# Patient Record
Sex: MALE | Race: WHITE | Employment: FULL TIME
[De-identification: names, ages, dates, MRNs, and addresses within clinical notes are randomized per-mention and may not be internally consistent; named-entity substitution may affect disease eponyms.]

---
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
## 2025-03-23 SDOH — HEALTH STABILITY: PHYSICAL HEALTH: ON AVERAGE, HOW MANY MINUTES DO YOU ENGAGE IN EXERCISE AT THIS LEVEL?: 60 MIN

## 2025-03-23 SDOH — HEALTH STABILITY: PHYSICAL HEALTH: ON AVERAGE, HOW MANY DAYS PER WEEK DO YOU ENGAGE IN MODERATE TO STRENUOUS EXERCISE (LIKE A BRISK WALK)?: 3 DAYS

## 2025-03-26 ENCOUNTER — OFFICE VISIT (OUTPATIENT)
Dept: PRIMARY CARE CLINIC | Facility: CLINIC | Age: 20
End: 2025-03-26
Payer: COMMERCIAL

## 2025-03-26 VITALS
DIASTOLIC BLOOD PRESSURE: 60 MMHG | SYSTOLIC BLOOD PRESSURE: 118 MMHG | HEIGHT: 72 IN | WEIGHT: 144.4 LBS | BODY MASS INDEX: 19.56 KG/M2

## 2025-03-26 DIAGNOSIS — B00.9 HSV INFECTION: ICD-10-CM

## 2025-03-26 DIAGNOSIS — J30.9 ALLERGIC RHINITIS, UNSPECIFIED SEASONALITY, UNSPECIFIED TRIGGER: ICD-10-CM

## 2025-03-26 DIAGNOSIS — Z00.00 ENCOUNTER FOR MEDICAL EXAMINATION TO ESTABLISH CARE: Primary | ICD-10-CM

## 2025-03-26 PROCEDURE — 99214 OFFICE O/P EST MOD 30 MIN: CPT | Performed by: INTERNAL MEDICINE

## 2025-03-26 RX ORDER — MULTIVITAMIN WITH IRON
1 TABLET ORAL DAILY
COMMUNITY

## 2025-03-26 SDOH — ECONOMIC STABILITY: FOOD INSECURITY: WITHIN THE PAST 12 MONTHS, YOU WORRIED THAT YOUR FOOD WOULD RUN OUT BEFORE YOU GOT MONEY TO BUY MORE.: NEVER TRUE

## 2025-03-26 SDOH — ECONOMIC STABILITY: FOOD INSECURITY: WITHIN THE PAST 12 MONTHS, THE FOOD YOU BOUGHT JUST DIDN'T LAST AND YOU DIDN'T HAVE MONEY TO GET MORE.: NEVER TRUE

## 2025-03-26 ASSESSMENT — PATIENT HEALTH QUESTIONNAIRE - PHQ9
SUM OF ALL RESPONSES TO PHQ QUESTIONS 1-9: 0
1. LITTLE INTEREST OR PLEASURE IN DOING THINGS: NOT AT ALL
2. FEELING DOWN, DEPRESSED OR HOPELESS: NOT AT ALL
SUM OF ALL RESPONSES TO PHQ QUESTIONS 1-9: 0

## 2025-03-26 ASSESSMENT — ENCOUNTER SYMPTOMS
GASTROINTESTINAL NEGATIVE: 1
RESPIRATORY NEGATIVE: 1

## 2025-03-26 NOTE — PROGRESS NOTES
FOLLOW UP VISIT    Subjective:    Bruno Solomon (: 2005) is a 20 y.o., male,   Chief Complaint   Patient presents with    Follow-up     Not fasting       HPI:  HPI  History of Present Illness  The patient presents to reestablish care.    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 No current issues or concerns are reported. A refill of Valtrex is not required at this time, as it is typically used only once annually.    A history of seasonal allergies is documented, although no symptoms are currently experienced.       The following portions of the patient's history were reviewed and updated as appropriate:      No past medical history on file.    No past surgical history on file.    Family History   Problem Relation Age of Onset    Congenital Hearing Loss Father     Bone Cancer Paternal Grandfather        Social History     Socioeconomic History    Marital status: Single     Spouse name: Not on file    Number of children: Not on file    Years of education: Not on file    Highest education level: Not on file   Occupational History    Not on file   Tobacco Use    Smoking status: Never    Smokeless tobacco: Never   Vaping Use    Vaping status: Never Used   Substance and Sexual Activity    Alcohol use: Yes     Comment: once a month    Drug use: Not Currently    Sexual activity: Not on file   Other Topics Concern    Not on file   Social History Narrative    Not on file     Social Drivers of Health     Financial Resource Strain: Low Risk  (2023)    Overall Financial Resource Strain (CARDIA)     Difficulty of Paying Living Expenses: Not very hard   Food Insecurity: No Food Insecurity (3/26/2025)    Hunger Vital Sign     Worried About Running Out of Food in the Last Year: Never true     Ran Out of Food in the Last Year: Never true   Transportation Needs: No Transportation Needs (3/26/2025)    PRAPARE - Transportation     Lack of Transportation (Medical): No     Lack of Transportation

## 2025-07-01 ENCOUNTER — OFFICE VISIT (OUTPATIENT)
Dept: PRIMARY CARE CLINIC | Facility: CLINIC | Age: 20
End: 2025-07-01
Payer: COMMERCIAL

## 2025-07-01 VITALS
HEIGHT: 72 IN | SYSTOLIC BLOOD PRESSURE: 114 MMHG | DIASTOLIC BLOOD PRESSURE: 78 MMHG | WEIGHT: 140 LBS | BODY MASS INDEX: 18.96 KG/M2 | OXYGEN SATURATION: 100 % | HEART RATE: 78 BPM

## 2025-07-01 DIAGNOSIS — B00.59 HSV (HERPES SIMPLEX VIRUS) INFECTION OF EYELID: ICD-10-CM

## 2025-07-01 DIAGNOSIS — Z00.00 ANNUAL PHYSICAL EXAM: ICD-10-CM

## 2025-07-01 DIAGNOSIS — Z00.00 ANNUAL PHYSICAL EXAM: Primary | ICD-10-CM

## 2025-07-01 LAB
ALBUMIN SERPL-MCNC: 4.6 G/DL (ref 3.5–5)
ALBUMIN/GLOB SERPL: 1.6 (ref 1–1.9)
ALP SERPL-CCNC: 82 U/L (ref 40–129)
ALT SERPL-CCNC: 15 U/L (ref 8–55)
ANION GAP SERPL CALC-SCNC: 11 MMOL/L (ref 7–16)
APPEARANCE UR: CLEAR
AST SERPL-CCNC: 18 U/L (ref 15–37)
BACTERIA URNS QL MICRO: NEGATIVE /HPF
BASOPHILS # BLD: 0.03 K/UL (ref 0–0.2)
BASOPHILS NFR BLD: 0.8 % (ref 0–2)
BILIRUB SERPL-MCNC: 2.4 MG/DL (ref 0–1.2)
BILIRUB UR QL: NEGATIVE
BUN SERPL-MCNC: 13 MG/DL (ref 6–23)
CALCIUM SERPL-MCNC: 9.8 MG/DL (ref 8.8–10.2)
CASTS URNS QL MICRO: 0 /LPF
CHLORIDE SERPL-SCNC: 104 MMOL/L (ref 98–107)
CHOLEST SERPL-MCNC: 148 MG/DL (ref 0–200)
CO2 SERPL-SCNC: 27 MMOL/L (ref 20–29)
COLOR UR: NORMAL
CREAT SERPL-MCNC: 0.9 MG/DL (ref 0.8–1.3)
CRYSTALS URNS QL MICRO: 0 /LPF
DIFFERENTIAL METHOD BLD: ABNORMAL
EOSINOPHIL # BLD: 0.04 K/UL (ref 0–0.8)
EOSINOPHIL NFR BLD: 1 % (ref 0.5–7.8)
EPI CELLS #/AREA URNS HPF: NORMAL /HPF (ref 0–5)
ERYTHROCYTE [DISTWIDTH] IN BLOOD BY AUTOMATED COUNT: 12.5 % (ref 11.9–14.6)
GLOBULIN SER CALC-MCNC: 3 G/DL (ref 2.3–3.5)
GLUCOSE SERPL-MCNC: 96 MG/DL (ref 70–99)
GLUCOSE UR STRIP.AUTO-MCNC: NEGATIVE MG/DL
HCT VFR BLD AUTO: 50.6 % (ref 41.1–50.3)
HDLC SERPL-MCNC: 61 MG/DL (ref 40–60)
HDLC SERPL: 2.4 (ref 0–5)
HGB BLD-MCNC: 16.7 G/DL (ref 13.6–17.2)
HGB UR QL STRIP: NEGATIVE
HYALINE CASTS URNS QL MICRO: NORMAL /LPF
IMM GRANULOCYTES # BLD AUTO: 0.01 K/UL (ref 0–0.5)
IMM GRANULOCYTES NFR BLD AUTO: 0.3 % (ref 0–5)
KETONES UR QL STRIP.AUTO: NEGATIVE MG/DL
LDLC SERPL CALC-MCNC: 70 MG/DL (ref 0–100)
LEUKOCYTE ESTERASE UR QL STRIP.AUTO: NEGATIVE
LYMPHOCYTES # BLD: 1.65 K/UL (ref 0.5–4.6)
LYMPHOCYTES NFR BLD: 42.3 % (ref 13–44)
MCH RBC QN AUTO: 30.6 PG (ref 26.1–32.9)
MCHC RBC AUTO-ENTMCNC: 33 G/DL (ref 31.4–35)
MCV RBC AUTO: 92.8 FL (ref 82–102)
MONOCYTES # BLD: 0.31 K/UL (ref 0.1–1.3)
MONOCYTES NFR BLD: 7.9 % (ref 4–12)
MUCOUS THREADS URNS QL MICRO: 0 /LPF
NEUTS SEG # BLD: 1.86 K/UL (ref 1.7–8.2)
NEUTS SEG NFR BLD: 47.7 % (ref 43–78)
NITRITE UR QL STRIP.AUTO: NEGATIVE
NRBC # BLD: 0 K/UL (ref 0–0.2)
PH UR STRIP: 7 (ref 5–9)
PLATELET # BLD AUTO: 164 K/UL (ref 150–450)
PMV BLD AUTO: 11 FL (ref 9.4–12.3)
POTASSIUM SERPL-SCNC: 4.2 MMOL/L (ref 3.5–5.1)
PROT SERPL-MCNC: 7.6 G/DL (ref 6.3–8.2)
PROT UR STRIP-MCNC: NEGATIVE MG/DL
RBC # BLD AUTO: 5.45 M/UL (ref 4.23–5.6)
RBC #/AREA URNS HPF: NORMAL /HPF (ref 0–5)
SODIUM SERPL-SCNC: 142 MMOL/L (ref 136–145)
SP GR UR REFRACTOMETRY: 1.02 (ref 1–1.02)
TRIGL SERPL-MCNC: 88 MG/DL (ref 0–150)
TSH, 3RD GENERATION: 1.39 UIU/ML (ref 0.27–4.2)
URINE CULTURE IF INDICATED: NORMAL
UROBILINOGEN UR QL STRIP.AUTO: 0.2 EU/DL (ref 0.2–1)
VLDLC SERPL CALC-MCNC: 18 MG/DL (ref 6–23)
WBC # BLD AUTO: 3.9 K/UL (ref 4.3–11.1)
WBC URNS QL MICRO: NORMAL /HPF (ref 0–4)

## 2025-07-01 PROCEDURE — 99395 PREV VISIT EST AGE 18-39: CPT | Performed by: INTERNAL MEDICINE

## 2025-07-01 RX ORDER — VALACYCLOVIR HYDROCHLORIDE 500 MG/1
500 TABLET, FILM COATED ORAL 2 TIMES DAILY
Qty: 60 TABLET | Refills: 1 | Status: SHIPPED | OUTPATIENT
Start: 2025-07-01

## 2025-07-01 ASSESSMENT — ENCOUNTER SYMPTOMS
ABDOMINAL PAIN: 0
COLOR CHANGE: 0
CHOKING: 0
EYE REDNESS: 0
ANAL BLEEDING: 0
CHEST TIGHTNESS: 0
BACK PAIN: 0
EYE ITCHING: 0
SINUS PRESSURE: 0
CONSTIPATION: 0
GASTROINTESTINAL NEGATIVE: 1
SHORTNESS OF BREATH: 0
ALLERGIC/IMMUNOLOGIC NEGATIVE: 1
EYE DISCHARGE: 0
NAUSEA: 0
WHEEZING: 0
RESPIRATORY NEGATIVE: 1
EYE PAIN: 0
RHINORRHEA: 0
COUGH: 0
DIARRHEA: 0
ABDOMINAL DISTENTION: 0
RECTAL PAIN: 0
BLOOD IN STOOL: 0
STRIDOR: 0
EYES NEGATIVE: 1

## 2025-07-01 NOTE — PROGRESS NOTES
FOLLOW UP VISIT    Subjective:    Bruno Solomon (: 2005) is a 20 y.o., male,   Chief Complaint   Patient presents with    Annual Exam       HPI:  HPI    20 year old in for cpe  The following portions of the patient's history were reviewed and updated as appropriate:      No past medical history on file.    No past surgical history on file.    Family History   Problem Relation Age of Onset    Congenital Hearing Loss Father     Bone Cancer Paternal Grandfather        Social History     Socioeconomic History    Marital status: Single     Spouse name: Not on file    Number of children: Not on file    Years of education: Not on file    Highest education level: Not on file   Occupational History    Not on file   Tobacco Use    Smoking status: Never    Smokeless tobacco: Never   Vaping Use    Vaping status: Never Used   Substance and Sexual Activity    Alcohol use: Yes     Comment: once a month    Drug use: Not Currently    Sexual activity: Not on file   Other Topics Concern    Not on file   Social History Narrative    Not on file     Social Drivers of Health     Financial Resource Strain: Low Risk  (2023)    Overall Financial Resource Strain (CARDIA)     Difficulty of Paying Living Expenses: Not very hard   Food Insecurity: No Food Insecurity (3/26/2025)    Hunger Vital Sign     Worried About Running Out of Food in the Last Year: Never true     Ran Out of Food in the Last Year: Never true   Transportation Needs: No Transportation Needs (3/26/2025)    PRAPARE - Transportation     Lack of Transportation (Medical): No     Lack of Transportation (Non-Medical): No   Physical Activity: Sufficiently Active (3/23/2025)    Exercise Vital Sign     Days of Exercise per Week: 3 days     Minutes of Exercise per Session: 60 min   Stress: Not on file   Social Connections: Unknown (3/19/2021)    Received from [facility]    Social Connections     Frequency of Communication with Friends and Family: Not asked     Frequency of

## 2025-07-02 ENCOUNTER — RESULTS FOLLOW-UP (OUTPATIENT)
Dept: PRIMARY CARE CLINIC | Facility: CLINIC | Age: 20
End: 2025-07-02

## 2025-07-07 ENCOUNTER — TELEMEDICINE (OUTPATIENT)
Dept: PRIMARY CARE CLINIC | Facility: CLINIC | Age: 20
End: 2025-07-07
Payer: COMMERCIAL

## 2025-07-07 DIAGNOSIS — Z67.91 UNSPECIFIED BLOOD TYPE, RH NEGATIVE: ICD-10-CM

## 2025-07-07 DIAGNOSIS — D72.819 LEUKOPENIA, UNSPECIFIED TYPE: Primary | ICD-10-CM

## 2025-07-07 DIAGNOSIS — R17 HIGH BILIRUBIN: ICD-10-CM

## 2025-07-07 PROCEDURE — 99214 OFFICE O/P EST MOD 30 MIN: CPT | Performed by: INTERNAL MEDICINE

## 2025-07-07 NOTE — PROGRESS NOTES
FOLLOW UP VISIT    Subjective:    Bruno Solomon (: 2005) is a 20 y.o., male,   No chief complaint on file.      HPI:  HPI    History of Present Illness  The patient is here for a follow-up visit.    High Bilirubin  - Their bilirubin is high.  - We will check a total direct and indirect bilirubin in 2 weeks, nonfasting.  - Recheck total and indirect bilirubin.    Low White Blood Cell Count  - Their white blood cell count is slightly low.  - We will recheck a white blood cell count in 2 weeks, nonfasting.  - Recheck white blood cell count.    Hematocrit  - Their hematocrit is up.    Cholesterol  - Cholesterol is good.  - HDL is high.    Supplemental information: They are on Valtrex as needed. Advised to increase hydration. Again, we will increase hydration.     The following portions of the patient's history were reviewed and updated as appropriate:      No past medical history on file.    No past surgical history on file.    Family History   Problem Relation Age of Onset    Congenital Hearing Loss Father     Bone Cancer Paternal Grandfather        Social History     Socioeconomic History    Marital status: Single     Spouse name: Not on file    Number of children: Not on file    Years of education: Not on file    Highest education level: Not on file   Occupational History    Not on file   Tobacco Use    Smoking status: Never    Smokeless tobacco: Never   Vaping Use    Vaping status: Never Used   Substance and Sexual Activity    Alcohol use: Yes     Comment: once a month    Drug use: Not Currently    Sexual activity: Not on file   Other Topics Concern    Not on file   Social History Narrative    Not on file     Social Drivers of Health     Financial Resource Strain: Low Risk  (2023)    Overall Financial Resource Strain (CARDIA)     Difficulty of Paying Living Expenses: Not very hard   Food Insecurity: No Food Insecurity (3/26/2025)    Hunger Vital Sign     Worried About Running Out of Food in the Last Year:

## 2025-07-23 ENCOUNTER — LAB (OUTPATIENT)
Dept: PRIMARY CARE CLINIC | Facility: CLINIC | Age: 20
End: 2025-07-23

## 2025-07-23 DIAGNOSIS — Z67.91 UNSPECIFIED BLOOD TYPE, RH NEGATIVE: ICD-10-CM

## 2025-07-23 DIAGNOSIS — R17 HIGH BILIRUBIN: ICD-10-CM

## 2025-07-23 DIAGNOSIS — D72.819 LEUKOPENIA, UNSPECIFIED TYPE: ICD-10-CM

## 2025-07-23 LAB
ABO + RH BLD: NORMAL
BILIRUB DIRECT SERPL-MCNC: 0.6 MG/DL (ref 0–0.3)
BILIRUB INDIRECT SERPL-MCNC: 2 MG/DL (ref 0–1.1)
BILIRUB SERPL-MCNC: 2.6 MG/DL (ref 0–1.2)
BLOOD BANK CMNT PATIENT-IMP: NORMAL
BLOOD GROUP ANTIBODIES SERPL: NORMAL
SPECIMEN EXP DATE BLD: NORMAL
WBC # BLD AUTO: 3.5 K/UL (ref 4.3–11.1)

## 2025-07-24 ENCOUNTER — RESULTS FOLLOW-UP (OUTPATIENT)
Dept: PRIMARY CARE CLINIC | Facility: CLINIC | Age: 20
End: 2025-07-24

## 2025-07-28 ENCOUNTER — TELEMEDICINE (OUTPATIENT)
Dept: PRIMARY CARE CLINIC | Facility: CLINIC | Age: 20
End: 2025-07-28
Payer: COMMERCIAL

## 2025-07-28 DIAGNOSIS — R17 HIGH BILIRUBIN: ICD-10-CM

## 2025-07-28 DIAGNOSIS — B00.59 HSV (HERPES SIMPLEX VIRUS) INFECTION OF EYELID: Primary | ICD-10-CM

## 2025-07-28 DIAGNOSIS — D72.819 LEUKOPENIA, UNSPECIFIED TYPE: ICD-10-CM

## 2025-07-28 DIAGNOSIS — E80.4 GILBERT'S DISEASE: ICD-10-CM

## 2025-07-28 PROCEDURE — 99214 OFFICE O/P EST MOD 30 MIN: CPT | Performed by: INTERNAL MEDICINE

## 2025-07-28 NOTE — PROGRESS NOTES
FOLLOW UP VISIT    Subjective:    Bruno Solomon (: 2005) is a 20 y.o., male,   No chief complaint on file.      HPI:  HPI    History of Present Illness  The patient is in for a follow-up visit.    Herpes  - They have a history of herpes  - Currently on Valtrex for management   Elevated bilirubin- c/w [place]  Leukopenia steady drop 7 then 3.9 now 3.5  The following portions of the patient's history were reviewed and updated as appropriate:      No past medical history on file.    No past surgical history on file.    Family History   Problem Relation Age of Onset    Congenital Hearing Loss Father     Bone Cancer Paternal Grandfather        Social History     Socioeconomic History    Marital status: Single     Spouse name: Not on file    Number of children: Not on file    Years of education: Not on file    Highest education level: Not on file   Occupational History    Not on file   Tobacco Use    Smoking status: Never    Smokeless tobacco: Never   Vaping Use    Vaping status: Never Used   Substance and Sexual Activity    Alcohol use: Yes     Comment: once a month    Drug use: Not Currently    Sexual activity: Not on file   Other Topics Concern    Not on file   Social History Narrative    Not on file     Social Drivers of Health     Financial Resource Strain: Low Risk  (2023)    Overall Financial Resource Strain (CARDIA)     Difficulty of Paying Living Expenses: Not very hard   Food Insecurity: No Food Insecurity (3/26/2025)    Hunger Vital Sign     Worried About Running Out of Food in the Last Year: Never true     Ran Out of Food in the Last Year: Never true   Transportation Needs: No Transportation Needs (3/26/2025)    PRAPARE - Transportation     Lack of Transportation (Medical): No     Lack of Transportation (Non-Medical): No   Physical Activity: Sufficiently Active (3/23/2025)    Exercise Vital Sign     Days of Exercise per Week: 3 days     Minutes of Exercise per Session: 60 min   Stress: Not on file

## 2025-09-02 ENCOUNTER — HOSPITAL ENCOUNTER (OUTPATIENT)
Dept: LAB | Age: 20
Discharge: HOME | End: 2025-09-02
Payer: COMMERCIAL

## 2025-09-02 ENCOUNTER — OFFICE VISIT (OUTPATIENT)
Dept: ONCOLOGY | Age: 20
End: 2025-09-02
Payer: COMMERCIAL

## 2025-09-02 VITALS
RESPIRATION RATE: 18 BRPM | BODY MASS INDEX: 18.69 KG/M2 | SYSTOLIC BLOOD PRESSURE: 135 MMHG | TEMPERATURE: 97.7 F | HEART RATE: 84 BPM | DIASTOLIC BLOOD PRESSURE: 84 MMHG | WEIGHT: 138 LBS | HEIGHT: 72 IN | OXYGEN SATURATION: 97 %

## 2025-09-02 DIAGNOSIS — D72.819 LEUKOPENIA, UNSPECIFIED TYPE: Primary | ICD-10-CM

## 2025-09-02 DIAGNOSIS — D72.819 LEUKOPENIA, UNSPECIFIED TYPE: ICD-10-CM

## 2025-09-02 LAB
ALBUMIN SERPL-MCNC: 4.8 G/DL (ref 3.5–5)
ALBUMIN/GLOB SERPL: 1.5 (ref 1–1.9)
ALP SERPL-CCNC: 84 U/L (ref 40–129)
ALT SERPL-CCNC: 15 U/L (ref 8–55)
ANION GAP SERPL CALC-SCNC: 14 MMOL/L (ref 7–16)
AST SERPL-CCNC: 19 U/L (ref 15–37)
BASOPHILS # BLD: 0.01 K/UL (ref 0–0.2)
BASOPHILS NFR BLD: 0.2 % (ref 0–2)
BILIRUB SERPL-MCNC: 2.5 MG/DL (ref 0–1.2)
BUN SERPL-MCNC: 10 MG/DL (ref 6–23)
CALCIUM SERPL-MCNC: 9.9 MG/DL (ref 8.8–10.2)
CHLORIDE SERPL-SCNC: 100 MMOL/L (ref 98–107)
CO2 SERPL-SCNC: 25 MMOL/L (ref 20–29)
CREAT SERPL-MCNC: 0.94 MG/DL (ref 0.8–1.3)
CRP SERPL-MCNC: <0.3 MG/DL (ref 0–0.4)
DIFFERENTIAL METHOD BLD: ABNORMAL
EOSINOPHIL # BLD: 0.04 K/UL (ref 0–0.8)
EOSINOPHIL NFR BLD: 0.9 % (ref 0.5–7.8)
ERYTHROCYTE [DISTWIDTH] IN BLOOD BY AUTOMATED COUNT: 13.1 % (ref 11.9–14.6)
ERYTHROCYTE [SEDIMENTATION RATE] IN BLOOD: <1 MM/HR (ref 0–15)
FERRITIN SERPL-MCNC: 148 NG/ML (ref 8–388)
GLOBULIN SER CALC-MCNC: 3.1 G/DL (ref 2.3–3.5)
GLUCOSE SERPL-MCNC: 94 MG/DL (ref 70–99)
HAV IGM SER QL: NONREACTIVE
HBV CORE IGM SER QL: NONREACTIVE
HBV SURFACE AG SER QL: NONREACTIVE
HCT VFR BLD AUTO: 46.3 % (ref 41.1–50.3)
HCV AB SER QL: NONREACTIVE
HGB BLD-MCNC: 16.4 G/DL (ref 13.6–17.2)
HGB RETIC QN AUTO: 37 PG (ref 29–35)
HIV 1+2 AB+HIV1 P24 AG SERPL QL IA: NONREACTIVE
HIV 1/2 RESULT COMMENT: NORMAL
IMM GRANULOCYTES # BLD AUTO: 0.01 K/UL (ref 0–0.5)
IMM GRANULOCYTES NFR BLD AUTO: 0.2 % (ref 0–5)
IMM RETICS NFR: 10.9 % (ref 2.3–13.4)
IRON SATN MFR SERPL: 34 % (ref 20–50)
IRON SERPL-MCNC: 99 UG/DL (ref 35–100)
KAPPA LC FREE SER-MCNC: 11.3 MG/L (ref 2.4–20.7)
KAPPA LC FREE/LAMBDA FREE SER: 1.8 (ref 0.2–1.8)
LAMBDA LC FREE SERPL-MCNC: 6.4 MG/L (ref 4.2–27.7)
LYMPHOCYTES # BLD: 1.5 K/UL (ref 0.5–4.6)
LYMPHOCYTES NFR BLD: 33.2 % (ref 13–44)
MCH RBC QN AUTO: 31.5 PG (ref 26.1–32.9)
MCHC RBC AUTO-ENTMCNC: 35.4 G/DL (ref 31.4–35)
MCV RBC AUTO: 88.9 FL (ref 82–102)
MONOCYTES # BLD: 0.28 K/UL (ref 0.1–1.3)
MONOCYTES NFR BLD: 6.2 % (ref 4–12)
NEUTS SEG # BLD: 2.68 K/UL (ref 1.7–8.2)
NEUTS SEG NFR BLD: 59.3 % (ref 43–78)
NRBC # BLD: 0 K/UL (ref 0–0.2)
PLATELET # BLD AUTO: 209 K/UL (ref 150–450)
PMV BLD AUTO: 10.1 FL (ref 9.4–12.3)
POTASSIUM SERPL-SCNC: 4 MMOL/L (ref 3.5–5.1)
PROT SERPL-MCNC: 7.8 G/DL (ref 6.3–8.2)
RBC # BLD AUTO: 5.21 M/UL (ref 4.23–5.6)
RETICS # AUTO: 0.07 M/UL (ref 0.03–0.1)
RETICS/RBC NFR AUTO: 1.3 % (ref 0.3–2)
SODIUM SERPL-SCNC: 139 MMOL/L (ref 136–145)
TIBC SERPL-MCNC: 292 UG/DL (ref 240–450)
UIBC SERPL-MCNC: 193 UG/DL (ref 112–347)
VIT B12 SERPL-MCNC: 418 PG/ML (ref 193–986)
WBC # BLD AUTO: 4.5 K/UL (ref 4.3–11.1)

## 2025-09-02 PROCEDURE — 82784 ASSAY IGA/IGD/IGG/IGM EACH: CPT

## 2025-09-02 PROCEDURE — 85046 RETICYTE/HGB CONCENTRATE: CPT

## 2025-09-02 PROCEDURE — 82607 VITAMIN B-12: CPT

## 2025-09-02 PROCEDURE — 84165 PROTEIN E-PHORESIS SERUM: CPT

## 2025-09-02 PROCEDURE — 86334 IMMUNOFIX E-PHORESIS SERUM: CPT

## 2025-09-02 PROCEDURE — 80053 COMPREHEN METABOLIC PANEL: CPT

## 2025-09-02 PROCEDURE — 83540 ASSAY OF IRON: CPT

## 2025-09-02 PROCEDURE — 83521 IG LIGHT CHAINS FREE EACH: CPT

## 2025-09-02 PROCEDURE — 85025 COMPLETE CBC W/AUTO DIFF WBC: CPT

## 2025-09-02 PROCEDURE — 84630 ASSAY OF ZINC: CPT

## 2025-09-02 PROCEDURE — 85652 RBC SED RATE AUTOMATED: CPT

## 2025-09-02 PROCEDURE — 82728 ASSAY OF FERRITIN: CPT

## 2025-09-02 PROCEDURE — 86140 C-REACTIVE PROTEIN: CPT

## 2025-09-02 PROCEDURE — 99243 OFF/OP CNSLTJ NEW/EST LOW 30: CPT | Performed by: INTERNAL MEDICINE

## 2025-09-02 PROCEDURE — 82525 ASSAY OF COPPER: CPT

## 2025-09-02 PROCEDURE — 86038 ANTINUCLEAR ANTIBODIES: CPT

## 2025-09-02 PROCEDURE — 87389 HIV-1 AG W/HIV-1&-2 AB AG IA: CPT

## 2025-09-02 PROCEDURE — 80074 ACUTE HEPATITIS PANEL: CPT

## 2025-09-02 PROCEDURE — 36415 COLL VENOUS BLD VENIPUNCTURE: CPT

## 2025-09-02 PROCEDURE — 83550 IRON BINDING TEST: CPT

## 2025-09-02 ASSESSMENT — PATIENT HEALTH QUESTIONNAIRE - PHQ9
SUM OF ALL RESPONSES TO PHQ QUESTIONS 1-9: 0
1. LITTLE INTEREST OR PLEASURE IN DOING THINGS: NOT AT ALL
SUM OF ALL RESPONSES TO PHQ QUESTIONS 1-9: 0
2. FEELING DOWN, DEPRESSED OR HOPELESS: NOT AT ALL
SUM OF ALL RESPONSES TO PHQ QUESTIONS 1-9: 0
SUM OF ALL RESPONSES TO PHQ QUESTIONS 1-9: 0

## 2025-09-03 LAB — ANA SER QL: NEGATIVE

## 2025-09-04 LAB
COPPER SERPL-MCNC: 79 UG/DL (ref 63–121)
ZINC SERPL-MCNC: 82 UG/DL (ref 44–115)

## 2025-09-05 LAB
ALBUMIN SERPL ELPH-MCNC: 4.4 G/DL (ref 2.9–4.4)
ALBUMIN/GLOB SERPL: 1.5 (ref 0.7–1.7)
ALPHA1 GLOB SERPL ELPH-MCNC: 0.2 G/DL (ref 0–0.4)
ALPHA2 GLOB SERPL ELPH-MCNC: 0.7 G/DL (ref 0.4–1)
B-GLOBULIN SERPL ELPH-MCNC: 1 G/DL (ref 0.7–1.3)
GAMMA GLOB SERPL ELPH-MCNC: 1 G/DL (ref 0.4–1.8)
GLOBULIN SER-MCNC: 3 G/DL (ref 2.2–3.9)
IGA SERPL-MCNC: 176 MG/DL (ref 90–386)
IGG SERPL-MCNC: 1073 MG/DL (ref 603–1613)
IGM SERPL-MCNC: 61 MG/DL (ref 20–172)
INTERPRETATION SERPL IEP-IMP: NORMAL
M PROTEIN SERPL ELPH-MCNC: NORMAL G/DL
PROT SERPL-MCNC: 7.4 G/DL (ref 6–8.5)